# Patient Record
Sex: FEMALE | Race: WHITE
[De-identification: names, ages, dates, MRNs, and addresses within clinical notes are randomized per-mention and may not be internally consistent; named-entity substitution may affect disease eponyms.]

---

## 2020-07-16 ENCOUNTER — APPOINTMENT (OUTPATIENT)
Dept: OBGYN | Facility: CLINIC | Age: 80
End: 2020-07-16

## 2020-07-16 PROBLEM — Z00.00 ENCOUNTER FOR PREVENTIVE HEALTH EXAMINATION: Status: ACTIVE | Noted: 2020-07-16

## 2021-11-30 ENCOUNTER — APPOINTMENT (OUTPATIENT)
Dept: PULMONOLOGY | Facility: CLINIC | Age: 81
End: 2021-11-30
Payer: MEDICARE

## 2021-11-30 VITALS
DIASTOLIC BLOOD PRESSURE: 82 MMHG | BODY MASS INDEX: 30.23 KG/M2 | OXYGEN SATURATION: 95 % | HEIGHT: 60 IN | WEIGHT: 154 LBS | HEART RATE: 68 BPM | SYSTOLIC BLOOD PRESSURE: 122 MMHG | RESPIRATION RATE: 14 BRPM

## 2021-11-30 DIAGNOSIS — Z86.79 PERSONAL HISTORY OF OTHER DISEASES OF THE CIRCULATORY SYSTEM: ICD-10-CM

## 2021-11-30 DIAGNOSIS — Z87.19 PERSONAL HISTORY OF OTHER DISEASES OF THE DIGESTIVE SYSTEM: ICD-10-CM

## 2021-11-30 DIAGNOSIS — Z86.59 PERSONAL HISTORY OF OTHER MENTAL AND BEHAVIORAL DISORDERS: ICD-10-CM

## 2021-11-30 PROCEDURE — 99205 OFFICE O/P NEW HI 60 MIN: CPT

## 2021-11-30 NOTE — DISCUSSION/SUMMARY
[FreeTextEntry1] : Exertional dyspnea \par Cardiac work up with equivocal stress echo, further work up in progress, echo as noted, normal LV, mild MR, mild PASP\par CTA 9/21 reviewed, no PE, small nodules, GG R apex\par Hx of asthma, but denies any benefit of inhalers used in past\par no new environmental exposures reported and symptoms are repeatable with exertion only\par Exercise oximetry today with out desaturation\par Will obtain PFTs, does not want rescue or ICS/LABA trial\par Await Cardiology work up\par Needs covid booster, 6 weeks or sooner if needed\par

## 2021-11-30 NOTE — CONSULT LETTER
[Dear  ___] : Dear  [unfilled], [Consult Letter:] : I had the pleasure of evaluating your patient, [unfilled]. [Please see my note below.] : Please see my note below. [Sincerely,] : Sincerely, [FreeTextEntry3] : [facility] DO [facility]\par Pulmonary Critical Care\par Director Pulmonary Division\par Medical Director Respiratory Therapy\par [facility]\par \par

## 2021-11-30 NOTE — PROCEDURE
[FreeTextEntry1] : equivocal stress echo, ? ischemia\par prior echo 1-2 plus MR, normal LV, pasp 30\par diastolic dysfunction suggested\par \par CTA 9/21 no PE, small nodules, small GG nodule R apex

## 2021-11-30 NOTE — PHYSICAL EXAM
[No Acute Distress] : no acute distress [Normal Appearance] : normal appearance [Normal Rate/Rhythm] : normal rate/rhythm [Normal S1, S2] : normal s1, s2 [No Murmurs] : no murmurs [No Resp Distress] : no resp distress [No Acc Muscle Use] : no acc muscle use [Normal Rhythm and Effort] : normal rhythm and effort [Clear to Auscultation Bilaterally] : clear to auscultation bilaterally [Normal to Percussion] : normal to percussion [No Abnormalities] : no abnormalities [No Clubbing] : no clubbing [No Cyanosis] : no cyanosis [FROM] : FROM [Normal Color/ Pigmentation] : normal color/ pigmentation [No Focal Deficits] : no focal deficits [Oriented x3] : oriented x3 [Normal Affect] : normal affect [TextBox_105] : trace edema

## 2021-11-30 NOTE — HISTORY OF PRESENT ILLNESS
[TextBox_4] : shortness of breath x 3 months\par saw cardiology , stress test negative per pt\par Saw Pulmonology in [place], told asthma, given an inhaler- not using\par also being for nodules by CXR- last CXR stable 1.5 yrs ago\par smoker x 15 yrs, quit 1980\par has limitations with daily activities

## 2022-01-06 ENCOUNTER — APPOINTMENT (OUTPATIENT)
Dept: PULMONOLOGY | Facility: CLINIC | Age: 82
End: 2022-01-06
Payer: MEDICARE

## 2022-01-06 VITALS
SYSTOLIC BLOOD PRESSURE: 124 MMHG | HEART RATE: 82 BPM | DIASTOLIC BLOOD PRESSURE: 74 MMHG | RESPIRATION RATE: 16 BRPM | OXYGEN SATURATION: 96 %

## 2022-01-06 VITALS — BODY MASS INDEX: 30.24 KG/M2 | WEIGHT: 150 LBS | HEIGHT: 59 IN

## 2022-01-06 PROCEDURE — 94729 DIFFUSING CAPACITY: CPT

## 2022-01-06 PROCEDURE — 85018 HEMOGLOBIN: CPT | Mod: QW

## 2022-01-06 PROCEDURE — 99214 OFFICE O/P EST MOD 30 MIN: CPT | Mod: 25

## 2022-01-06 PROCEDURE — 94727 GAS DIL/WSHOT DETER LNG VOL: CPT

## 2022-01-06 PROCEDURE — 94010 BREATHING CAPACITY TEST: CPT

## 2022-01-06 NOTE — DISCUSSION/SUMMARY
[FreeTextEntry1] : Exertional dyspnea x months\par Cardiac work up with equivocal stress echo, further work up in progress, echo as noted, normal LV, mild MR, mild PASP\par CTA 9/21 reviewed, no PE, small nodules, GG R apex, will repeat 6 months\par PFts are normal\par Await Cardiology work up, discussed exercise program\par Had  covid booster\par 4 months or sooner if needed , will call with Ct scan in March\par

## 2022-01-06 NOTE — PROCEDURE
[FreeTextEntry1] : equivocal stress echo, ? ischemia\par prior echo 1-2 plus MR, normal LV, pasp 30\par diastolic dysfunction suggested\par \par CTA 9/21 no PE, small nodules, small GG nodule R apex \par \par PFTs are normal

## 2022-01-06 NOTE — HISTORY OF PRESENT ILLNESS
[TextBox_4] : chronic ALFORD\par saw cardiology , stress test negative per pt\par Had CTA no PE, lung nodules\par smoker x 15 yrs, quit 1980\par has limitations with daily activities \par denies any snoring

## 2022-01-06 NOTE — CONSULT LETTER
[Dear  ___] : Dear  [unfilled], [Consult Letter:] : I had the pleasure of evaluating your patient, [unfilled]. [Please see my note below.] : Please see my note below. [Sincerely,] : Sincerely, [FreeTextEntry3] : Seth Parrish DO [facility]\par Pulmonary Critical Care\par Director Pulmonary Division\par Medical Director Respiratory Therapy\par [facility]\par \par

## 2022-04-20 ENCOUNTER — TRANSCRIPTION ENCOUNTER (OUTPATIENT)
Age: 82
End: 2022-04-20

## 2022-07-01 ENCOUNTER — NON-APPOINTMENT (OUTPATIENT)
Age: 82
End: 2022-07-01

## 2022-07-05 ENCOUNTER — APPOINTMENT (OUTPATIENT)
Dept: PULMONOLOGY | Facility: CLINIC | Age: 82
End: 2022-07-05

## 2023-02-20 ENCOUNTER — NON-APPOINTMENT (OUTPATIENT)
Age: 83
End: 2023-02-20

## 2023-05-18 ENCOUNTER — APPOINTMENT (OUTPATIENT)
Dept: PULMONOLOGY | Facility: CLINIC | Age: 83
End: 2023-05-18

## 2023-08-17 ENCOUNTER — APPOINTMENT (OUTPATIENT)
Dept: CARDIOLOGY | Facility: CLINIC | Age: 83
End: 2023-08-17

## 2024-01-10 ENCOUNTER — APPOINTMENT (OUTPATIENT)
Dept: ORTHOPEDIC SURGERY | Facility: CLINIC | Age: 84
End: 2024-01-10
Payer: MEDICARE

## 2024-01-10 DIAGNOSIS — M79.606 PAIN IN LEG, UNSPECIFIED: ICD-10-CM

## 2024-01-10 DIAGNOSIS — E78.00 PURE HYPERCHOLESTEROLEMIA, UNSPECIFIED: ICD-10-CM

## 2024-01-10 PROCEDURE — 72100 X-RAY EXAM L-S SPINE 2/3 VWS: CPT

## 2024-01-10 PROCEDURE — 72170 X-RAY EXAM OF PELVIS: CPT

## 2024-01-10 PROCEDURE — 99203 OFFICE O/P NEW LOW 30 MIN: CPT

## 2024-01-10 PROCEDURE — 73562 X-RAY EXAM OF KNEE 3: CPT | Mod: LT

## 2024-01-10 NOTE — ASSESSMENT
[FreeTextEntry1] : acute onset of left leg pain since september 2023.  no injury.  also shooting left leg pain.  some lower back pain/ issues.  patient feels like pain in knee but only at night. does not bother her during walking. mild oa in knee. history of l spine surgery and this could be from back. some retained hardware in l spine and alignment issues.    defers injection knee or PT.  get relief from asa

## 2024-01-10 NOTE — PHYSICAL EXAM
[NL (0)] : extension 0 degrees [4___] : hamstring 4[unfilled]/5 [Equivocal] : equivocal Aneudy [Outside films reviewed] : Outside films reviewed [Disc space narrowing] : Disc space narrowing [FreeTextEntry1] : retained hardware [] : no erythema [Left] : left knee [There are no fractures, subluxations or dislocations. No significant abnormalities are seen] : There are no fractures, subluxations or dislocations. No significant abnormalities are seen [Degenerative change] : Degenerative change [FreeTextEntry8] : lateral >> [de-identified] : able to slr [TWNoteComboBox7] : flexion 100 degrees

## 2024-01-10 NOTE — HISTORY OF PRESENT ILLNESS
[0] : 0 [Radiating] : radiating [Sharp] : sharp [Sleep] : sleep [Rest] : rest [Lying in bed] : lying in bed [de-identified] : 1/10/24:  acute onset of left knee pain since september 2023.  no injury.  also having pain shooting up and down the leg with associated lower back discomfort.  [] : no [FreeTextEntry1] : L knee  [FreeTextEntry3] : 2-3 months ago  [FreeTextEntry5] : pt states no injury has occurred.

## 2024-01-12 ENCOUNTER — APPOINTMENT (OUTPATIENT)
Dept: CARDIOLOGY | Facility: CLINIC | Age: 84
End: 2024-01-12
Payer: MEDICARE

## 2024-01-12 VITALS
SYSTOLIC BLOOD PRESSURE: 146 MMHG | WEIGHT: 144 LBS | OXYGEN SATURATION: 99 % | HEART RATE: 84 BPM | BODY MASS INDEX: 29.03 KG/M2 | DIASTOLIC BLOOD PRESSURE: 80 MMHG | HEIGHT: 59 IN

## 2024-01-12 PROCEDURE — 99204 OFFICE O/P NEW MOD 45 MIN: CPT

## 2024-01-12 PROCEDURE — 93000 ELECTROCARDIOGRAM COMPLETE: CPT

## 2024-01-12 RX ORDER — LOSARTAN POTASSIUM 100 MG/1
TABLET, FILM COATED ORAL
Refills: 0 | Status: DISCONTINUED | COMMUNITY
End: 2024-01-12

## 2024-01-12 NOTE — DISCUSSION/SUMMARY
[Hyperlipidemia] : hyperlipidemia [Hypertension] : hypertension [Stable] : stable [Patient] : the patient [FreeTextEntry1] : As suggested by her cardiologist, I have recommended  cMRI and Pulmonary  and  Rheum follow. up. Pt will require sedation for MRI. Pt will follow up in 1 month.

## 2024-01-12 NOTE — HISTORY OF PRESENT ILLNESS
[FreeTextEntry1] : 82 yo female presents for evaluation of exertional and rest shortness of breath. She reports a "sticking" discomfort which is transient. She has occasional  fluttering which is related to a cough. She has been evaluated at [facility].  Pt is short of breath with minimal exertion. She has history of HTN, HLD.

## 2024-01-29 ENCOUNTER — APPOINTMENT (OUTPATIENT)
Dept: ORTHOPEDIC SURGERY | Facility: CLINIC | Age: 84
End: 2024-01-29
Payer: MEDICARE

## 2024-01-29 VITALS — HEIGHT: 59 IN | WEIGHT: 144 LBS | BODY MASS INDEX: 29.03 KG/M2

## 2024-01-29 DIAGNOSIS — M47.816 SPONDYLOSIS W/OUT MYELOPATHY OR RADICULOPATHY, LUMBAR REGION: ICD-10-CM

## 2024-01-29 PROCEDURE — 99213 OFFICE O/P EST LOW 20 MIN: CPT

## 2024-01-29 PROCEDURE — 72100 X-RAY EXAM L-S SPINE 2/3 VWS: CPT

## 2024-02-01 ENCOUNTER — OUTPATIENT (OUTPATIENT)
Dept: OUTPATIENT SERVICES | Facility: HOSPITAL | Age: 84
LOS: 1 days | End: 2024-02-01

## 2024-02-01 ENCOUNTER — APPOINTMENT (OUTPATIENT)
Dept: MRI IMAGING | Facility: CLINIC | Age: 84
End: 2024-02-01
Payer: MEDICARE

## 2024-02-01 ENCOUNTER — RESULT REVIEW (OUTPATIENT)
Age: 84
End: 2024-02-01

## 2024-02-01 DIAGNOSIS — R06.09 OTHER FORMS OF DYSPNEA: ICD-10-CM

## 2024-02-01 PROCEDURE — 75565 CARD MRI VELOC FLOW MAPPING: CPT | Mod: 26,MH

## 2024-02-01 PROCEDURE — 75561 CARDIAC MRI FOR MORPH W/DYE: CPT | Mod: 26

## 2024-02-04 NOTE — DISCUSSION/SUMMARY
[de-identified] : 82 y/o F presenting with chronic low back pain. Xray today reveals unilateral fixation L4-S1. no evidence for fracture. The origin of the pain seems to be muscular, as suggested by clinical examination, the overall symptom complex, and imaging. The patient is advised to continue using NSAIDs or Tylenol during flare-ups for symptomatic relief. Patient was provided with a referral for lumbar physical therapy to work on stretching, strengthening and range of motion. Follow up in 6 weeks.   Prior to appointment and during encounter with patient extensive medical records were reviewed including but not limited to, hospital records, outpatient records, imaging results, and lab data. During this appointment the patient was examined, diagnoses were discussed and explained in a face to face manner. In addition extensive time was spent reviewing aforementioned diagnostic studies. Counseling including abnormal image results, differential diagnoses, treatment options, risk and benefits, lifestyle changes, current condition, and current medications was performed. Patient's comments, questions, and concerns were addressed and patient verbalized understanding. Based on this patient's presentation at our office, which is an orthopedic spine surgeon's office, this patient inherently / intrinsically has a risk, however minute, of developing issues such as Cauda equina syndrome, bowel and bladder changes, or progression of motor or neurological deficits such as paralysis which may be permanent.  MICHELINE MONTES Acting as a Scribe for Dr. Nolan YUNG, Micheline Montes, attest that this documentation has been prepared under the direction and in the presence of Provider Jude Francisco MD.

## 2024-02-04 NOTE — HISTORY OF PRESENT ILLNESS
[10] : 10 [3] : 3 [Dull/Aching] : dull/aching [Localized] : localized [Nothing helps with pain getting better] : Nothing helps with pain getting better [Retired] : Work status: retired [de-identified] : 01/29/2024 - 83-year-old female is here for an initial evaluation, reporting chronic low back pain persisting for one year. The pain is localized to the low back without radiation into the legs. There is no associated numbness or tingling in the lower extremities, and no observed change in lower extremity strength. She has a history of two back surgeries in 2006 and 2018, which she notes were helpful at the time. However, there have been no treatments for her back within the last year. She has been using over-the-counter medications without significant benefit. The patient attributes a weakened core to the increase in her back pain. The pain is exacerbated when standing and is more controlled when sitting. Additionally, her back pain limits her ability to walk. She reports her bone density is good. Denies any recent traumas.         [] : no [de-identified] : orthopedic [de-identified] : mri l-spine done in [place] yrs ago [de-identified] : 2006, 2018

## 2024-02-04 NOTE — DATA REVIEWED
[I independently reviewed and interpreted images and report] : I independently reviewed and interpreted images and report [FreeTextEntry1] : I stop paperwork reviewed Orthopedic progress notes reviewed

## 2024-02-14 ENCOUNTER — APPOINTMENT (OUTPATIENT)
Dept: INTERNAL MEDICINE | Facility: CLINIC | Age: 84
End: 2024-02-14
Payer: MEDICARE

## 2024-02-14 VITALS
WEIGHT: 138 LBS | RESPIRATION RATE: 16 BRPM | SYSTOLIC BLOOD PRESSURE: 140 MMHG | HEIGHT: 60 IN | HEART RATE: 70 BPM | BODY MASS INDEX: 27.09 KG/M2 | TEMPERATURE: 97.5 F | OXYGEN SATURATION: 95 % | DIASTOLIC BLOOD PRESSURE: 86 MMHG

## 2024-02-14 DIAGNOSIS — R91.1 SOLITARY PULMONARY NODULE: ICD-10-CM

## 2024-02-14 DIAGNOSIS — Z87.891 PERSONAL HISTORY OF NICOTINE DEPENDENCE: ICD-10-CM

## 2024-02-14 PROCEDURE — ZZZZZ: CPT

## 2024-02-14 PROCEDURE — 99204 OFFICE O/P NEW MOD 45 MIN: CPT

## 2024-02-14 RX ORDER — ALPRAZOLAM 0.5 MG/1
0.5 TABLET ORAL
Refills: 0 | Status: ACTIVE | COMMUNITY

## 2024-02-14 RX ORDER — CEVIMELINE HYDROCHLORIDE 30 MG/1
30 CAPSULE ORAL DAILY
Refills: 0 | Status: ACTIVE | COMMUNITY

## 2024-02-14 RX ORDER — ATORVASTATIN CALCIUM 20 MG/1
20 TABLET, FILM COATED ORAL DAILY
Refills: 0 | Status: ACTIVE | COMMUNITY

## 2024-02-14 RX ORDER — AMLODIPINE BESYLATE 5 MG/1
5 TABLET ORAL DAILY
Refills: 0 | Status: ACTIVE | COMMUNITY

## 2024-02-14 RX ORDER — ALBUTEROL 90 MCG
90 AEROSOL (GRAM) INHALATION 3 TIMES DAILY
Refills: 0 | Status: ACTIVE | COMMUNITY

## 2024-02-14 RX ORDER — YOHIMBE BARK 500 MG
CAPSULE ORAL EVERY 8 HOURS
Refills: 0 | Status: ACTIVE | COMMUNITY

## 2024-02-14 RX ORDER — NICOTINE POLACRILEX 2 MG
1 GUM BUCCAL DAILY
Refills: 0 | Status: ACTIVE | COMMUNITY

## 2024-02-14 RX ORDER — ROPINIROLE 0.5 MG/1
0.5 TABLET, FILM COATED ORAL DAILY
Refills: 0 | Status: ACTIVE | COMMUNITY

## 2024-02-14 RX ORDER — VIT C/E/ZN/COPPR/LUTEIN/ZEAXAN 250MG-90MG
CAPSULE ORAL DAILY
Refills: 0 | Status: ACTIVE | COMMUNITY

## 2024-02-14 RX ORDER — LOSARTAN POTASSIUM 100 MG/1
100 TABLET, FILM COATED ORAL DAILY
Refills: 0 | Status: ACTIVE | COMMUNITY

## 2024-02-14 RX ORDER — FUROSEMIDE 20 MG/1
20 TABLET ORAL DAILY
Refills: 0 | Status: ACTIVE | COMMUNITY

## 2024-02-14 RX ORDER — TRAZODONE HYDROCHLORIDE 100 MG/1
100 TABLET ORAL DAILY
Refills: 0 | Status: ACTIVE | COMMUNITY

## 2024-02-14 RX ORDER — UBIDECARENONE 30 MG
CAPSULE ORAL DAILY
Refills: 0 | Status: ACTIVE | COMMUNITY

## 2024-02-14 NOTE — HISTORY OF PRESENT ILLNESS
[TextBox_4] : Mrs. John is an 83-year-old female who presents for initial pulmonary evaluation complaining of shortness of breath at rest and with minimal exertion.  She is accompanied by her .  Patient states that over the past 2 years she has developed significant shortness of breath that occurs with even minimal exertion.  She admits to having seen 3 pulmonologist previously.  She apparently had a workup at [place] which included a 6-minute walk test and a cardiopulmonary I exercise stress test.  All studies were normal.  Mrs. John states that she is short of breath even sitting quietly on the examining table.  She has a remote smoking history.  There is no history of asthma or COPD.  She had a cardiac MRI which showed an ejection fraction of 60%.  There was no evidence of right-sided heart dysfunction.

## 2024-02-14 NOTE — PROCEDURE
[FreeTextEntry1] : Complete pulmonary function test show normal spirometry, lung volumes and flow volume loop.  Diffusing capacity is normal.  There is no evidence of obstructive or restrictive lung disease.

## 2024-02-14 NOTE — DISCUSSION/SUMMARY
[FreeTextEntry1] : Mrs. John is an 84-year-old female who presents for pulmonary evaluation with symptoms of shortness of breath at rest and with even mild exertion.  She has had pulmonary workup from at least 3 other pulmonologist.  Pulmonary workup at [place] included pulmonary function test, 6-minute walk test and probable cardiopulmonary exercise test which were all normal.  Patient does have a murmur consistent with aortic stenosis.  She did have a cardiac MRI which showed normal left ventricular ejection fraction and no evidence of right heart dysfunction.  Patient's shortness of breath may be psychological.  I explained that it is very unusual for someone with even significant pulmonary or cardiac disease to have significant symptoms at rest.  Patient will follow-up on an as-needed basis.  There is nothing presently that can be added to her previous extensive cardiopulmonary workup.

## 2024-02-15 ENCOUNTER — APPOINTMENT (OUTPATIENT)
Dept: CARDIOLOGY | Facility: CLINIC | Age: 84
End: 2024-02-15
Payer: MEDICARE

## 2024-02-15 VITALS
HEART RATE: 70 BPM | OXYGEN SATURATION: 97 % | WEIGHT: 138 LBS | DIASTOLIC BLOOD PRESSURE: 100 MMHG | HEIGHT: 60 IN | SYSTOLIC BLOOD PRESSURE: 159 MMHG | BODY MASS INDEX: 27.09 KG/M2

## 2024-02-15 PROCEDURE — G2211 COMPLEX E/M VISIT ADD ON: CPT

## 2024-02-15 PROCEDURE — 99214 OFFICE O/P EST MOD 30 MIN: CPT

## 2024-02-15 PROCEDURE — 93000 ELECTROCARDIOGRAM COMPLETE: CPT

## 2024-02-15 NOTE — DISCUSSION/SUMMARY
[Hyperlipidemia] : hyperlipidemia [Hypertension] : hypertension [Stable] : stable [Patient] : the patient [FreeTextEntry1] : As suggested by her cardiologist, I have recommended  cMRI and Pulmonary  and  Rheum follow. up. Pt will follow up with Rheumatology.  [EKG obtained to assist in diagnosis and management of assessed problem(s)] : EKG obtained to assist in diagnosis and management of assessed problem(s)

## 2024-02-15 NOTE — HISTORY OF PRESENT ILLNESS
[FreeTextEntry1] : 84 yo female presents for evaluation of exertional and rest shortness of breath. She reports a "sticking" discomfort which is transient. She has occasional  fluttering which is related to a cough. She has been evaluated at [facility].  Pt is short of breath with minimal exertion. She has history of HTN, HLD. Pulmonary evaluation and MR was reviewed. LINQ was inserted at [facility].

## 2024-02-21 ENCOUNTER — APPOINTMENT (OUTPATIENT)
Dept: INTERNAL MEDICINE | Facility: CLINIC | Age: 84
End: 2024-02-21

## 2024-03-12 ENCOUNTER — APPOINTMENT (OUTPATIENT)
Dept: CARDIOLOGY | Facility: CLINIC | Age: 84
End: 2024-03-12
Payer: MEDICARE

## 2024-03-12 PROCEDURE — 93306 TTE W/DOPPLER COMPLETE: CPT

## 2024-04-01 ENCOUNTER — APPOINTMENT (OUTPATIENT)
Dept: RHEUMATOLOGY | Facility: CLINIC | Age: 84
End: 2024-04-01

## 2024-04-03 ENCOUNTER — APPOINTMENT (OUTPATIENT)
Dept: ENDOCRINOLOGY | Facility: CLINIC | Age: 84
End: 2024-04-03
Payer: MEDICARE

## 2024-04-03 VITALS
HEART RATE: 75 BPM | DIASTOLIC BLOOD PRESSURE: 68 MMHG | SYSTOLIC BLOOD PRESSURE: 134 MMHG | WEIGHT: 140 LBS | RESPIRATION RATE: 16 BRPM | BODY MASS INDEX: 27.48 KG/M2 | HEIGHT: 60 IN | OXYGEN SATURATION: 96 %

## 2024-04-03 DIAGNOSIS — Z83.3 FAMILY HISTORY OF DIABETES MELLITUS: ICD-10-CM

## 2024-04-03 DIAGNOSIS — Q43.3 CONGENITAL MALFORMATIONS OF INTESTINAL FIXATION: ICD-10-CM

## 2024-04-03 DIAGNOSIS — Z78.9 OTHER SPECIFIED HEALTH STATUS: ICD-10-CM

## 2024-04-03 DIAGNOSIS — M35.00 SICCA SYNDROME, UNSPECIFIED: ICD-10-CM

## 2024-04-03 DIAGNOSIS — E21.3 HYPERPARATHYROIDISM, UNSPECIFIED: ICD-10-CM

## 2024-04-03 DIAGNOSIS — Z80.3 FAMILY HISTORY OF MALIGNANT NEOPLASM OF BREAST: ICD-10-CM

## 2024-04-03 DIAGNOSIS — M85.80 OTHER SPECIFIED DISORDERS OF BONE DENSITY AND STRUCTURE, UNSPECIFIED SITE: ICD-10-CM

## 2024-04-03 PROCEDURE — 99204 OFFICE O/P NEW MOD 45 MIN: CPT

## 2024-04-03 RX ORDER — ALPRAZOLAM 0.25 MG/1
0.25 TABLET ORAL
Refills: 0 | Status: DISCONTINUED | COMMUNITY
End: 2024-04-03

## 2024-04-03 NOTE — DATA REVIEWED
[FreeTextEntry1] : LABS: 11/3/2023: PTH 86  DXA 1/22/2021: Forearm  T score -1.7 Left femoral neck T score - 1.7 Left total hip -1.8

## 2024-04-03 NOTE — HISTORY OF PRESENT ILLNESS
[FreeTextEntry1] : Referred here by PCP for hyperparathyroidism.  Labs in November of this year showed slightly elevated PTH level of 86 (calcium was not drawn at the time). Patient does report history of hypercalcemia (mild) over the past several years.     No history of kidney stones, fractures or osteoporosis.     She did report an episode of FENG in the past related to diuretic use, but otherwise reports normal kidney function. Had DXA done in the past showing osteopenia.    Since December of 2022, has been dealing with dyspnea on exertion and weakness.  Had extensive pulmonary and cardiac evaluation without any obvious cause.  Will be getting a second neurology opinion soon.  Saw Rheumatology and diagnosed with Sjogren's Syndrome.

## 2024-04-03 NOTE — ASSESSMENT
[FreeTextEntry1] : 83 year old female with PMH of HTN, HLD, Osteopenia, Sjogrens Syndrome presents for evaluation of hyperparathyroidism.  At this point, without record of calcium results, it is unclear what the underlying etiology of her hyperparathyroidism is.    1.  HPT-  repeat labs now to include calcium, PTH, 25(OH)D, Mg and Phos to better determine primary versus secondary HPT.   If there is hypercalcemia present, she may need 24 hour urine to rule out FHH, but Furosemide would need to be held.    2.  Osteopenia-  needs updated DXA now, but will likely need treatment as he FRAX score for risk of hip fracture was 6% on her last DXA.  Will discuss treatment options based on new data.    Follow up to be determined based on results of above tests.

## 2024-04-03 NOTE — REVIEW OF SYSTEMS
[Recent Weight Loss (___ Lbs)] : recent weight loss: [unfilled] lbs [Palpitations] : palpitations [Shortness Of Breath] : shortness of breath [Muscle Weakness] : muscle weakness [Insomnia] : insomnia [Polydipsia] : polydipsia [Fatigue] : no fatigue [Constipation] : no constipation [Abdominal Pain] : no abdominal pain [Anxiety] : no anxiety [FreeTextEntry8] : no kidney stones

## 2024-04-03 NOTE — PHYSICAL EXAM
[Healthy Appearance] : healthy appearance [No Acute Distress] : no acute distress [Normal Sclera/Conjunctiva] : normal sclera/conjunctiva [No Proptosis] : no proptosis [No Neck Mass] : no neck mass was observed [No LAD] : no lymphadenopathy [Supple] : the neck was supple [Thyroid Not Enlarged] : the thyroid was not enlarged [No Thyroid Nodules] : no palpable thyroid nodules [No Respiratory Distress] : no respiratory distress [Clear to Auscultation] : lungs were clear to auscultation bilaterally [Normal S1, S2] : normal S1 and S2 [Normal Rate] : heart rate was normal [Regular Rhythm] : with a regular rhythm [Acanthosis Nigricans] : no acanthosis nigricans [Normal Affect] : the affect was normal [Normal Insight/Judgement] : insight and judgment were intact [Normal Mood] : the mood was normal [de-identified] : 3/6 systolic murmur

## 2024-05-06 ENCOUNTER — APPOINTMENT (OUTPATIENT)
Dept: CARDIOLOGY | Facility: CLINIC | Age: 84
End: 2024-05-06
Payer: MEDICARE

## 2024-05-06 ENCOUNTER — NON-APPOINTMENT (OUTPATIENT)
Age: 84
End: 2024-05-06

## 2024-05-06 VITALS
BODY MASS INDEX: 27.09 KG/M2 | HEIGHT: 60 IN | DIASTOLIC BLOOD PRESSURE: 78 MMHG | WEIGHT: 138 LBS | SYSTOLIC BLOOD PRESSURE: 126 MMHG | OXYGEN SATURATION: 94 % | HEART RATE: 79 BPM

## 2024-05-06 DIAGNOSIS — R06.09 OTHER FORMS OF DYSPNEA: ICD-10-CM

## 2024-05-06 DIAGNOSIS — I10 ESSENTIAL (PRIMARY) HYPERTENSION: ICD-10-CM

## 2024-05-06 DIAGNOSIS — E78.5 HYPERLIPIDEMIA, UNSPECIFIED: ICD-10-CM

## 2024-05-06 PROCEDURE — 99215 OFFICE O/P EST HI 40 MIN: CPT

## 2024-05-06 PROCEDURE — 93000 ELECTROCARDIOGRAM COMPLETE: CPT

## 2024-05-06 PROCEDURE — G2211 COMPLEX E/M VISIT ADD ON: CPT

## 2024-05-06 NOTE — HISTORY OF PRESENT ILLNESS
[FreeTextEntry1] : 82 yo female presents for evaluation of exertional and rest shortness of breath. She reports a "sticking" discomfort which is transient. She has occasional  fluttering which is related to a cough. She has been evaluated at [facility] in the past..  Pt is short of breath with minimal exertion. She has history of HTN, HLD. Pulmonary evaluation and MR was reviewed. LINQ was inserted at [facility] which is appx 1 year old. Pt reports that she is more short of breath than prior visit.

## 2024-05-06 NOTE — DISCUSSION/SUMMARY
[Hyperlipidemia] : hyperlipidemia [Hypertension] : hypertension [Stable] : stable [Patient] : the patient [FreeTextEntry1] : Pulmonary and Cardiac workup were reviewed and were essentially unremarkable. ?Late enhancement on MR. Plan: R & L heart catheterization explained and ordered.  [EKG obtained to assist in diagnosis and management of assessed problem(s)] : EKG obtained to assist in diagnosis and management of assessed problem(s)

## 2024-07-12 ENCOUNTER — APPOINTMENT (OUTPATIENT)
Dept: NEUROLOGY | Facility: CLINIC | Age: 84
End: 2024-07-12

## 2024-08-21 ENCOUNTER — NON-APPOINTMENT (OUTPATIENT)
Age: 84
End: 2024-08-21

## 2024-09-19 LAB
25(OH)D3 SERPL-MCNC: 38.1 NG/ML
ALBUMIN SERPL ELPH-MCNC: 4.3 G/DL
ALP BLD-CCNC: 108 U/L
ALT SERPL-CCNC: 33 U/L
ANION GAP SERPL CALC-SCNC: 11 MMOL/L
AST SERPL-CCNC: 38 U/L
BILIRUB SERPL-MCNC: 1.1 MG/DL
BUN SERPL-MCNC: 13 MG/DL
CALCIUM SERPL-MCNC: 10 MG/DL
CALCIUM SERPL-MCNC: 10 MG/DL
CHLORIDE SERPL-SCNC: 100 MMOL/L
CO2 SERPL-SCNC: 29 MMOL/L
CREAT SERPL-MCNC: 0.54 MG/DL
EGFR: 92 ML/MIN/1.73M2
GLUCOSE SERPL-MCNC: 92 MG/DL
MAGNESIUM SERPL-MCNC: 2.1 MG/DL
PARATHYROID HORMONE INTACT: 113 PG/ML
PHOSPHATE SERPL-MCNC: 3.2 MG/DL
POTASSIUM SERPL-SCNC: 4.6 MMOL/L
PROT SERPL-MCNC: 6.9 G/DL
SODIUM SERPL-SCNC: 140 MMOL/L
TSH SERPL-ACNC: 2.86 UIU/ML

## 2024-09-20 LAB — CA-I SERPL-SCNC: 5.3 MG/DL

## 2024-12-27 ENCOUNTER — APPOINTMENT (OUTPATIENT)
Dept: ORTHOPEDIC SURGERY | Facility: CLINIC | Age: 84
End: 2024-12-27

## 2025-01-20 ENCOUNTER — APPOINTMENT (OUTPATIENT)
Dept: ORTHOPEDIC SURGERY | Facility: CLINIC | Age: 85
End: 2025-01-20

## 2025-04-11 ENCOUNTER — APPOINTMENT (OUTPATIENT)
Dept: ORTHOPEDIC SURGERY | Facility: CLINIC | Age: 85
End: 2025-04-11
Payer: MEDICARE

## 2025-04-11 VITALS — BODY MASS INDEX: 27.09 KG/M2 | HEIGHT: 60 IN | WEIGHT: 138 LBS

## 2025-04-11 DIAGNOSIS — M47.816 SPONDYLOSIS W/OUT MYELOPATHY OR RADICULOPATHY, LUMBAR REGION: ICD-10-CM

## 2025-04-11 PROCEDURE — 99214 OFFICE O/P EST MOD 30 MIN: CPT

## 2025-06-26 ENCOUNTER — APPOINTMENT (OUTPATIENT)
Dept: ENDOCRINOLOGY | Facility: CLINIC | Age: 85
End: 2025-06-26
Payer: MEDICARE

## 2025-06-26 VITALS
BODY MASS INDEX: 26.11 KG/M2 | WEIGHT: 133 LBS | HEIGHT: 60 IN | TEMPERATURE: 98 F | OXYGEN SATURATION: 97 % | RESPIRATION RATE: 16 BRPM | DIASTOLIC BLOOD PRESSURE: 62 MMHG | HEART RATE: 51 BPM | SYSTOLIC BLOOD PRESSURE: 116 MMHG

## 2025-06-26 PROCEDURE — G2211 COMPLEX E/M VISIT ADD ON: CPT

## 2025-06-26 PROCEDURE — 99214 OFFICE O/P EST MOD 30 MIN: CPT

## 2025-06-26 RX ORDER — ALENDRONATE SODIUM 70 MG/1
70 TABLET ORAL
Qty: 12 | Refills: 1 | Status: ACTIVE | COMMUNITY
Start: 2025-06-26 | End: 1900-01-01